# Patient Record
Sex: MALE | Race: BLACK OR AFRICAN AMERICAN | NOT HISPANIC OR LATINO | URBAN - NONMETROPOLITAN AREA
[De-identification: names, ages, dates, MRNs, and addresses within clinical notes are randomized per-mention and may not be internally consistent; named-entity substitution may affect disease eponyms.]

---

## 2017-01-25 NOTE — PATIENT DISCUSSION
POSTERIOR CAPSULAR FIBROSIS, OU:  VISUALLY SIGNIFICANT. SCHEDULE YAG CAPSULOTOMY OD FIRST THEN LATER YAG CAPSULOTOMY OS IF VISUAL SYMPTOMS PERSIST.

## 2017-01-25 NOTE — PATIENT DISCUSSION
Continue: Refresh Tears (carboxymethylcellulose sodium): drops: 0.5% 1 drop twice a day as needed into both eyes

## 2018-08-15 NOTE — PATIENT DISCUSSION
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 RECOMMENDS OMEGA-3 FISH OIL WITH [facility] APPROVAL. RETURN FOR FOLLOW-UP AS SCHEDULED OR SOONER IF SYMPTOMS WORSEN.

## 2019-04-01 NOTE — PATIENT DISCUSSION
CONJUNCTIVAL ABRASION OD: PRESCRIBED ERYTHROMYCIN SHAINA TID x 1 week. ATS PRN. RTC 1 WEEK/SOONER IF SYMPTOMS INCREASE/PERSIST.

## 2019-04-01 NOTE — PATIENT DISCUSSION
New Prescription: erythromycin (erythromycin): ointment: 5 mg/gram (0.5 %) 1 a small amount three times a day into right eye 04-

## 2019-04-08 NOTE — PATIENT DISCUSSION
Continue: erythromycin (erythromycin): ointment: 5 mg/gram (0.5 %) 1 a small amount three times a day into right eye 04-

## 2019-04-08 NOTE — PATIENT DISCUSSION
SMALL PALPEBRAL CONJUNCTIVAL CYSTS OU: BENIGN IN APPEARANCE TODAY. DISCUSSED REFERRAL TO DR. Lawrence Nesbitt IF THEY GROW OR BECOME BOTHERSOME TO PATIENT. MONITOR.

## 2019-04-23 NOTE — PATIENT DISCUSSION
CONJUNCTIVAL CYST OU: NO TREATMENT INDICATED AT THIS TIME SINCE THEY ARE NOT BOTHERING PATIENT. ADVISED HIM THAT IF THEY GET WORSE TO CALL OUR OFFICE AND TREATMENT WILL BE DISCUSSED.

## 2019-05-21 ENCOUNTER — IMPORTED ENCOUNTER (OUTPATIENT)
Dept: URBAN - NONMETROPOLITAN AREA CLINIC 1 | Facility: CLINIC | Age: 74
End: 2019-05-21

## 2019-05-21 PROBLEM — E11.3293: Noted: 2019-05-21

## 2019-05-21 PROBLEM — Z96.1: Noted: 2019-05-21

## 2019-05-21 PROBLEM — H35.033: Noted: 2019-05-21

## 2019-05-21 PROBLEM — H52.4: Noted: 2020-11-05

## 2019-05-21 PROBLEM — H25.812: Noted: 2019-05-21

## 2019-05-21 PROCEDURE — 92015 DETERMINE REFRACTIVE STATE: CPT

## 2019-05-21 PROCEDURE — 92250 FUNDUS PHOTOGRAPHY W/I&R: CPT

## 2019-05-21 PROCEDURE — 92014 COMPRE OPH EXAM EST PT 1/>: CPT

## 2019-05-21 NOTE — PATIENT DISCUSSION
NPDR without edema OU-Stressed the importance of keeping blood sugars under control blood pressure under control and weight normalization and regular visits with PCP. -Explained the possible effects of poorly controlled diabetes and the damage that diabetes can cause to ocular health. -Patient to check HgbA1C.-Pt instructed to contact our office with any vision changes. HTN Retinopathy OU- Discussed the importance of blood pressure control in the prevention of ocular complications. - Discussed possible association with systemic conditions including hypertension and diabetes. - Stressed the importance of controlling vascular risk factors. - Recommended observation. Cataract OS-Not yet surgical. -Reviewed symptoms of advancing cataract growth such as glare and halos and decreased vision.-Continue to monitor for now. Pt will notify us if any new symptoms develop. s/p PCIOL-Stable PCIOL OD.-Monitor for PCO.

## 2020-11-04 ENCOUNTER — IMPORTED ENCOUNTER (OUTPATIENT)
Dept: URBAN - NONMETROPOLITAN AREA CLINIC 1 | Facility: CLINIC | Age: 75
End: 2020-11-04

## 2020-11-04 PROCEDURE — 92014 COMPRE OPH EXAM EST PT 1/>: CPT

## 2020-11-04 PROCEDURE — 92015 DETERMINE REFRACTIVE STATE: CPT

## 2020-11-04 NOTE — PATIENT DISCUSSION
IDDM with mild NPDR OUDiscussed diagnosis with patient. Discussed the risk of diabetic damage of the retina with potential vision loss and the importance of routine follow-up. Emphasized strict blood sugar control. Continue to monitor. Hypertensive Retinopathy OUDiscussed diagnosis with patient. Discussed the risk of hypertensive damage of the retina with potential vision loss and the importance of routine follow-up. Emphasized strict blood pressure control. Continue to monitor. Combined Cataracts OSDiscussed diagnosis with patient. Reviewed symptoms related to cataract progression. Discussed various treatment options with patient. Recommend cataract evaluation by Dr. Jessica Corley. Patient elects to schedule cat eval.Continue to monitor. P/C IOL ODDiscussed diagnosis in detail with patient. Intraocular implant in place and stable. Continue to monitor. Presbyopia OUDiscussed refractive status in detail with patient. MR done today but do not recommend updating due to cataract progression. Continue to monitor.

## 2022-04-09 ASSESSMENT — VISUAL ACUITY
OS_CC: 20/50
OD_CC: CF4'
OD_SC: 20/40
OS_SC: 20/40

## 2022-04-09 ASSESSMENT — TONOMETRY
OD_IOP_MMHG: 20
OS_IOP_MMHG: 18
OS_IOP_MMHG: 16
OD_IOP_MMHG: 16